# Patient Record
Sex: FEMALE | Race: WHITE | URBAN - METROPOLITAN AREA
[De-identification: names, ages, dates, MRNs, and addresses within clinical notes are randomized per-mention and may not be internally consistent; named-entity substitution may affect disease eponyms.]

---

## 2024-06-10 ENCOUNTER — OFFICE VISIT (OUTPATIENT)
Age: 69
End: 2024-06-10
Payer: MEDICARE

## 2024-06-10 VITALS
DIASTOLIC BLOOD PRESSURE: 88 MMHG | OXYGEN SATURATION: 97 % | SYSTOLIC BLOOD PRESSURE: 159 MMHG | TEMPERATURE: 97.6 F | HEART RATE: 60 BPM

## 2024-06-10 DIAGNOSIS — R68.89 BLOOD PRESSURE ALTERATION: ICD-10-CM

## 2024-06-10 DIAGNOSIS — R33.9 RETENTION OF URINE: Primary | ICD-10-CM

## 2024-06-10 DIAGNOSIS — R31.21 ASYMPTOMATIC MICROSCOPIC HEMATURIA: ICD-10-CM

## 2024-06-10 DIAGNOSIS — N81.4 UTERINE PROLAPSE: ICD-10-CM

## 2024-06-10 DIAGNOSIS — R39.15 URINARY URGENCY: ICD-10-CM

## 2024-06-10 DIAGNOSIS — N81.10 PROLAPSE OF FEMALE BLADDER, ACQUIRED: ICD-10-CM

## 2024-06-10 DIAGNOSIS — R10.2 PELVIC PRESSURE IN FEMALE: ICD-10-CM

## 2024-06-10 PROBLEM — M67.441 GANGLION CYST OF FINGER OF RIGHT HAND: Status: ACTIVE | Noted: 2024-06-10

## 2024-06-10 PROBLEM — E78.00 HYPERCHOLESTEROLEMIA: Status: ACTIVE | Noted: 2020-09-01

## 2024-06-10 PROBLEM — R73.09 ABNORMAL GLUCOSE: Status: ACTIVE | Noted: 2020-09-01

## 2024-06-10 LAB
BILIRUBIN, POC: ABNORMAL
BLOOD URINE, POC: ABNORMAL
CLARITY, POC: CLEAR
COLOR, POC: YELLOW
GLUCOSE URINE, POC: ABNORMAL
KETONES, POC: ABNORMAL
LEUKOCYTE EST, POC: ABNORMAL
NITRITE, POC: ABNORMAL
PH, POC: 6
PROTEIN, POC: ABNORMAL
SPECIFIC GRAVITY, POC: 1.01
UROBILINOGEN, POC: ABNORMAL

## 2024-06-10 PROCEDURE — 1123F ACP DISCUSS/DSCN MKR DOCD: CPT

## 2024-06-10 PROCEDURE — 81003 URINALYSIS AUTO W/O SCOPE: CPT

## 2024-06-10 PROCEDURE — 51798 US URINE CAPACITY MEASURE: CPT

## 2024-06-10 PROCEDURE — 99213 OFFICE O/P EST LOW 20 MIN: CPT

## 2024-06-10 RX ORDER — CYCLOBENZAPRINE HCL 10 MG
10 TABLET ORAL 2 TIMES DAILY PRN
COMMUNITY
Start: 1900-01-01

## 2024-06-10 RX ORDER — IBUPROFEN 200 MG
200 TABLET ORAL EVERY 6 HOURS PRN
COMMUNITY

## 2024-06-10 ASSESSMENT — ENCOUNTER SYMPTOMS
COUGH: 0
ABDOMINAL PAIN: 0
GASTROINTESTINAL NEGATIVE: 1
CHEST TIGHTNESS: 0
CONSTIPATION: 0
FACIAL SWELLING: 0
RECTAL PAIN: 0
DIARRHEA: 0
VOICE CHANGE: 0
NAUSEA: 0
VOMITING: 0
TROUBLE SWALLOWING: 0
BLOOD IN STOOL: 0
SHORTNESS OF BREATH: 0

## 2024-06-10 NOTE — PROGRESS NOTES
[facility], [facility] UROGYNECOLOGY AND PELVIC REHABILITATION   [address] [place] [address]  Dept: [phone]  Date: 6/10/2024  Patient Name: Jeana Worthy    VISIT - FOLLOW UP VISIT     CC: had concerns including Follow-up (Patient here for PVR recheck ).       Chaperone present: None Required    HPI: Patient is here for a follow up on PVR and UA to recheck for hematuria and bladder emptying. She does feel that she can empty her bladder well, denies pelvic pain or vaginal bleeding. Denies kalina hematuria. She does have a known cystocele and uterine prolapse, examined at previous visit. This prolapse is bothersome to her, but she is able to manage. She is not interested in a pessary, but more so interested in a surgical repair. Reports occasional urinary urgency/frequency. Denies any urinary leakage.     Overall state of well-being, dietary and nutritional habits, exercise routines of at least 30 minutes 3 times a week, bowel and bladder function, smoking history, HRT history, sexual activity and partner/s, dyspareunia, and immunizations all revisited if necessary by chart review or direct questioning.    Topics of Prolapse, Pain, Pressure were revisited including type, period of onset, level of severity, quality and quantity, associations, trends, exacerbators, alleviators, bleeding, prolapse to reduce, splinting, and prior treatment / surgery.    Topics of Urinary Leakage were revisited including type, period of onset, level of severity, quality and quantity, associations, trends, exacerbators, alleviators, urgency, frequency, nocturia ,urge incontinence / stress incontinence triggers, hesitancy, obstruction with need to catheterize, frequent UTI\"s >3 in a year diagnosed by culture, hematuria (gross or microscopic), urinary stones, and prior treatment / surgery.    Topics of Fecal Incontinence were revisited including type, period of

## 2024-06-10 NOTE — PATIENT INSTRUCTIONS
rectocele repair, you may need to have an enema the night before the surgery.     Anesthesia   You will often have your choice of either general or regional anesthesia . With general anesthesia, you will be asleep. Regional anesthesia will numb your lower body, but you will be awake.     Description of the Procedure   A bladder catheter will be inserted in the urethra to decrease pressure on the bladder.   A cut in the skin will be made to expose the involved muscle and tissue. In some cases, the muscles and tissue will be sewn back onto itself. This will make it stronger. In other cases, a mesh-type material will be used to strengthen the tissue. Any tissue that has been weakened by previous surgeries, pregnancies, or age will be removed. Excess vaginal lining will be removed as well.   In some cases, a suspension or elevation procedure may be done to provide extra support to the bladder.     How Long Will It Take?   45 minutes to two or more hours     How Much Will It Hurt?   You will likely experience vaginal discomfort for 1-2 weeks following the surgery. You will be given medicine to help relieve this.     Average Hospital Stay   The usual length of stay is 1-2 days. Your doctor may choose to keep you longer if complications arise.     Post-procedure Care At the Hospital   A medicated vaginal packing is usually left in the vagina overnight.   If you had a rectocele repair, the bladder catheter will be removed as soon as you are able to use the restroom on your own. If you had a cystocele repair, the bladder catheter often needs to stay in longer (sometimes 2-6 days). This will allow the bladder time to begin working normally again.   You may notice a smelly, even bloody, discharge from the vagina for 1-2 weeks.     At Home   When you return home, do the following to help ensure a smooth recovery:   Avoid lifting anything that weighs more than 10 pounds for about six weeks.   Avoid sexual intercourse for about

## 2024-10-02 ENCOUNTER — PROCEDURE VISIT (OUTPATIENT)
Age: 69
End: 2024-10-02

## 2024-10-02 DIAGNOSIS — N81.10 PROLAPSE OF FEMALE BLADDER, ACQUIRED: ICD-10-CM

## 2024-10-02 DIAGNOSIS — R33.9 RETENTION OF URINE: Primary | ICD-10-CM

## 2024-10-02 DIAGNOSIS — R39.15 URINARY URGENCY: ICD-10-CM

## 2024-10-02 DIAGNOSIS — R10.2 PELVIC PRESSURE IN FEMALE: ICD-10-CM

## 2024-10-02 DIAGNOSIS — N81.4 UTERINE PROLAPSE: ICD-10-CM

## 2024-10-02 DIAGNOSIS — R31.21 ASYMPTOMATIC MICROSCOPIC HEMATURIA: ICD-10-CM

## 2024-10-07 NOTE — PROGRESS NOTES
vaginal hysterectomy and repairs.    2.   Educational handouts were discussed & given when applicable for surgery.       Multiple records reviewed. All questions were addressed to the patient's satisfaction.  Additional time spent regarding today's visit:  45 Performing: ACVISITTIMEPERFORM: Pre-visit chart review and note construction, Extensive counseling, Covering additional health topics on top of those listed in the chief complaint, and Additional testing and or procedures    Point of care: The supervising physician was present & available for assistance during the critical portions of the visit & procedure    Follow up: Return if symptoms worsen or fail to improve, for post op.     Electronically signed by Shiraz Gray DO on 10/8/2024 at 10:40 AM

## 2024-10-08 ENCOUNTER — PROCEDURE VISIT (OUTPATIENT)
Age: 69
End: 2024-10-08
Payer: MEDICARE

## 2024-10-08 VITALS — DIASTOLIC BLOOD PRESSURE: 80 MMHG | SYSTOLIC BLOOD PRESSURE: 116 MMHG

## 2024-10-08 DIAGNOSIS — N81.10 PROLAPSE OF FEMALE BLADDER, ACQUIRED: ICD-10-CM

## 2024-10-08 DIAGNOSIS — Z41.9 ELECTIVE SURGERY: ICD-10-CM

## 2024-10-08 DIAGNOSIS — N81.4 UTERINE PROLAPSE: ICD-10-CM

## 2024-10-08 DIAGNOSIS — R39.15 URINARY URGENCY: ICD-10-CM

## 2024-10-08 DIAGNOSIS — R33.9 RETENTION OF URINE: Primary | ICD-10-CM

## 2024-10-08 DIAGNOSIS — R31.21 ASYMPTOMATIC MICROSCOPIC HEMATURIA: ICD-10-CM

## 2024-10-08 DIAGNOSIS — R10.2 PELVIC PRESSURE IN FEMALE: ICD-10-CM

## 2024-10-08 DIAGNOSIS — I86.2 VARICOSITIES OF PELVIS: ICD-10-CM

## 2024-10-08 PROCEDURE — 99459 PELVIC EXAMINATION: CPT | Performed by: OBSTETRICS & GYNECOLOGY

## 2024-10-08 PROCEDURE — 1123F ACP DISCUSS/DSCN MKR DOCD: CPT | Performed by: OBSTETRICS & GYNECOLOGY

## 2024-10-08 PROCEDURE — 52000 CYSTOURETHROSCOPY: CPT | Performed by: OBSTETRICS & GYNECOLOGY

## 2024-10-08 PROCEDURE — 99215 OFFICE O/P EST HI 40 MIN: CPT | Performed by: OBSTETRICS & GYNECOLOGY

## 2024-10-08 NOTE — PATIENT INSTRUCTIONS
SURGICAL COUNSELING   PREOPERATIVE CONSIDERATIONS:  DECISION FOR SURGERY The decision to have surgery is completely up to the patient. They have either failed conservative measures, decline conservative measures in favor of elective surgery, or have an emergent need for surgical intervention.   HOSPITAL STAY: For outpatient procedures, the patient will be dismissed from the hospital or surgery setting when stable and meeting criteria for discharge. Less than 5% of patients may rebound to an emergency setting for some of the risks mentioned above, even though they have met criteria for dismissal earlier. Outpatient surgical recovery usually occurs between 2 and 4 weeks. For inpatient procedures, the hospital stay may be 1-3 days, and the patient may not be fully recovered from major surgery for up to 6-8 weeks.  PREOPERATIVE EDUCATION: Written handouts and additional video links regarding perioperative education for the proposed surgery were given to the patient.   RESIDENT ASSISTANCE: The patient gives permission for Dr. Gray's on service resident to assist in their care which may include learning based performance of part of the surgical procedure. The patient understands Dr. Gray will be present at all times during the procedure and will always complete its critical portion.   PROCEED AS INDICATED: The patient gives Dr. Shiraz Gray consent to PROCEED AS INDICATED if additional surgery is needed for pathology discovered at the time of surgery. (Example: potential vaginal hysterectomy during a cystocele repair if uterus drops under anesthesia but is not evident on a prior office visit).   Sabianist: If the patient does not accept blood products, they understand that gynecologic surgery may have the risk of blood loss resulting in major complications including but not limited to hemorrhage, anemia, further surgery, infection, incisional breakdown, cardiac arrest, stroke, or death. Although Dr. Gray may

## 2024-10-29 ENCOUNTER — PREP FOR PROCEDURE (OUTPATIENT)
Age: 69
End: 2024-10-29

## 2024-10-29 DIAGNOSIS — N81.10 FEMALE CYSTOCELE: ICD-10-CM

## 2024-10-29 DIAGNOSIS — N81.4 UTERINE PROLAPSE: ICD-10-CM

## 2024-10-29 DIAGNOSIS — Z01.818 PRE-OP TESTING: Primary | ICD-10-CM

## 2024-11-14 ENCOUNTER — HOSPITAL ENCOUNTER (OUTPATIENT)
Dept: PREADMISSION TESTING | Age: 69
Discharge: HOME | End: 2024-11-18
Payer: MEDICARE

## 2024-11-14 VITALS
SYSTOLIC BLOOD PRESSURE: 160 MMHG | OXYGEN SATURATION: 96 % | DIASTOLIC BLOOD PRESSURE: 91 MMHG | HEART RATE: 87 BPM | TEMPERATURE: 98 F | HEIGHT: 61 IN | BODY MASS INDEX: 34.17 KG/M2 | WEIGHT: 181 LBS | RESPIRATION RATE: 24 BRPM

## 2024-11-14 DIAGNOSIS — Z01.818 PRE-OP TESTING: Primary | ICD-10-CM

## 2024-11-14 LAB
ABO + RH BLD: NORMAL
ALBUMIN SERPL-MCNC: 4.5 G/DL (ref 3.5–5.2)
ANION GAP SERPL CALCULATED.3IONS-SCNC: 12 MMOL/L (ref 9–16)
ARM BAND NUMBER: NORMAL
BLOOD BANK SAMPLE EXPIRATION: NORMAL
BLOOD GROUP ANTIBODIES SERPL: NEGATIVE
BUN SERPL-MCNC: 25 MG/DL (ref 8–23)
CALCIUM SERPL-MCNC: 9.3 MG/DL (ref 8.6–10.4)
CHLORIDE SERPL-SCNC: 103 MMOL/L (ref 98–107)
CO2 SERPL-SCNC: 24 MMOL/L (ref 20–31)
CREAT SERPL-MCNC: 0.7 MG/DL (ref 0.6–0.9)
ERYTHROCYTE [DISTWIDTH] IN BLOOD BY AUTOMATED COUNT: 12.8 % (ref 11.8–14.4)
GFR, ESTIMATED: >90 ML/MIN/1.73M2
GLUCOSE SERPL-MCNC: 94 MG/DL (ref 74–99)
HCT VFR BLD AUTO: 41.6 % (ref 36.3–47.1)
HGB BLD-MCNC: 14 G/DL (ref 11.9–15.1)
MCH RBC QN AUTO: 31.5 PG (ref 25.2–33.5)
MCHC RBC AUTO-ENTMCNC: 33.7 G/DL (ref 28.4–34.8)
MCV RBC AUTO: 93.5 FL (ref 82.6–102.9)
NRBC BLD-RTO: 0 PER 100 WBC
PLATELET # BLD AUTO: 220 K/UL (ref 138–453)
PMV BLD AUTO: 10.2 FL (ref 8.1–13.5)
POTASSIUM SERPL-SCNC: 4.5 MMOL/L (ref 3.7–5.3)
RBC # BLD AUTO: 4.45 M/UL (ref 3.95–5.11)
SODIUM SERPL-SCNC: 139 MMOL/L (ref 136–145)
WBC OTHER # BLD: 4.4 K/UL (ref 3.5–11.3)

## 2024-11-14 PROCEDURE — 93005 ELECTROCARDIOGRAM TRACING: CPT

## 2024-11-14 PROCEDURE — 85027 COMPLETE CBC AUTOMATED: CPT

## 2024-11-14 PROCEDURE — 80048 BASIC METABOLIC PNL TOTAL CA: CPT

## 2024-11-14 PROCEDURE — 86900 BLOOD TYPING SEROLOGIC ABO: CPT

## 2024-11-14 PROCEDURE — 86850 RBC ANTIBODY SCREEN: CPT

## 2024-11-14 PROCEDURE — 86901 BLOOD TYPING SEROLOGIC RH(D): CPT

## 2024-11-14 PROCEDURE — 36415 COLL VENOUS BLD VENIPUNCTURE: CPT

## 2024-11-14 PROCEDURE — 82040 ASSAY OF SERUM ALBUMIN: CPT

## 2024-11-14 NOTE — DISCHARGE INSTRUCTIONS
DAY OF SURGERY/PROCEDURE  GUIDELINES    As a patient at the [facility], you can expect quality medical and nursing care that is centered on your individual needs. It is our goal to make your surgical experience as comfortable and excellent as possible.  ________________________________________________________________________    The following instructions are general guidelines, if any information on this sheet is different from what your doctor has instructed you to do, please follow your doctor's instructions.    Please arrive on 11/29/2024 @ 0600      Enter through entrance C. Check in at registration     Upon arrival you will be taken to the pre-operative area to get ready for surgery, your family will stay in the waiting room and visit with you once you are ready for surgery. Due to special limitations please limit visitation to 1-2 members of your family at a time. When it is time for surgery your family will return to the waiting room.    Nothing to eat, drink, smoke, suck or chew after midnight (no water, gum, mints, cigarettes, cigars, pipes, snuff, chewing tobacco, etc.) or your surgery may be canceled.     Take a shower or bath on the morning of your surgery/procedure (Hibiclens if directed) Do not apply any lotions.    Brush your teeth, but do not swallow any water    IN CASE OF ILLNESS - If you have a cold or flu symptoms (high fever, runny nose, sore throat, cough, etc.) rash, nausea, vomiting, loose stools, and/or recent contact with someone who has a contagious disease (chick pox, measles, etc.) please call your doctor before coming to the surgery center    Take a small sip of water with heart, blood pressure, and/or seizure medication the morning of surgery.     If applicable bring your:  Inhaler (s)  Hearing aid(s)  Eyeglasses and Case (If you wear contacts they have to be removed before surgery, bring case and solution)  CPAP     DO NOT take anticoagulants (blood thinners,  aspirin or aspirin-containing products) as instructed by your physician.    DO NOT take any diabetic pills or insulin morning of your surgery.     Leave all jewelry at home and wear loose, comfortable clothing that is easy to put on and take off.     If you will be returning home the same day as your surgery, you will need to have a responsible adult (18 years of age or older) present to drive you home. You will need someone stay with you at home for the first 24 hours following your surgery. This is due to the anesthesia and the medication given to you during surgery and recovery.

## 2024-11-15 LAB
EKG ATRIAL RATE: 61 BPM
EKG P AXIS: 42 DEGREES
EKG P-R INTERVAL: 244 MS
EKG Q-T INTERVAL: 436 MS
EKG QRS DURATION: 86 MS
EKG QTC CALCULATION (BAZETT): 438 MS
EKG R AXIS: 4 DEGREES
EKG T AXIS: 40 DEGREES
EKG VENTRICULAR RATE: 61 BPM

## 2024-11-15 PROCEDURE — 93010 ELECTROCARDIOGRAM REPORT: CPT | Performed by: INTERNAL MEDICINE

## 2024-11-25 ENCOUNTER — ANESTHESIA EVENT (OUTPATIENT)
Dept: OPERATING ROOM | Age: 69
End: 2024-11-25
Payer: MEDICARE

## 2024-11-28 NOTE — BRIEF OP NOTE
Brief Operative Note  Department of Uroynecology  [facility]     Patient: Jeana Worthy   : 1955  MRN: [id number]       Acct: [id number]   Date of Procedure: 2024    Pre-operative Diagnosis: 69 y.o. female    Urinary retention  Prolapse of the female bladder  Uterine prolapse  Urinary urgency  Varicosities of pelvis  BMI 34    Post-operative Diagnosis:   Urinary retention  Prolapse of the female bladder  Uterine prolapse  Urinary urgency  Varicosities of pelvis  BMI 34    Procedure: Robotic assisted laparoscopic hysterectomy, bilateral salpingo-oophorectomy, anterior colporrhaphy, cystoscopy    Surgeon: Dr. Shiraz Gray DO     Assistant(s): ambrose Zapata, PGY4    Anesthesia: general via ETT    Findings:  normal appearing external genitalia, grade 3 cystocele, normal appearing uterus, bilateral fallopian tubes and ovaries, bilateral ureteral jets visualized, normal appearing bladder mucosa   Total IV fluids/Blood products:  1000 ml crystalloid  Urine Output:  200 ml    Estimated blood loss:  10ml  Drains:  none  Specimens:  uterus, cervix, bilateral fallopian tubes and ovaries, vaginal mucosa   Instrument and Sponge Count: Correct  Complications:  none  Condition:  stable, transferred to post anesthesia recovery    Neil Schwartz MD  Urogyn Resident  2024, 4:10 PM

## 2024-11-28 NOTE — DISCHARGE INSTRUCTIONS
POSTOPERATIVE PATIENT INSTRUCTIONS  MAJOR & MINOR SURGICAL PROCEDURES      Congratulations! You have taken the brave step of undergoing surgery in order to try to improve your health.  Now it is time to focus on healing outside of the hospital / surgery center setting.  To make your dismissal as successful as possible, it is recommended that you thoroughly review these postoperative instructions. These instructions pertain to, but are not limited to the following procedures:      MAJOR   Hysterectomy - Vaginal / Laparoscopic / Robotic   With or Without tube-ovarian Removal (Salpingo-oophorectomy)   Sacrocolpopexy / Sacroperineopexy / Sacrocervicopexy/ Hysteropexy (lifting apex to sacrum)  Major Vaginal Prolapse repairs   Cystocele repair (Anterior Colporrhaphy)   Rectocele repair (Posterior Colporrhaphy)   Enterocele repair    Vaginal vault repair   Closing or removal of the vagina (Colpocleisis / Colpectomy)   Major urinary incontinence surgery (Saunders Urethropexy, MMK)   Major fibroid removal (Myomectomy)   Major tubal surgery (re-anastomosis, ectopic pregnancy, pelvic inflammatory disease)   Major surgery for adhesions or endometriosis involving bowel   Major vaginal mesh removal    Fistula repair of the bladder or colorectum to the vagina   Creation of a new vagina (Neovaginoplasty) or repair of a Mullerian Anomaly   Other       MINOR   Hysteroscopy with Dilatation / Curettage, Endometrial Ablation   Laparoscopy With or Without minor tubal or ovarian surgery   Minor Vaginal Prolapse repairs   Cystocele repair (Anterior Colporrhaphy)   Rectocele repair (Posterior Colporrhaphy)   Urethrocele repair    Minor urinary incontinence surgery (Slings, Transurethral Bulking)   Minor vaginal surgery (Mesh removal, Laser ablation, Biopsies, Labial revisions, Injections)    Minor surgery of the bladder (DMSO, Hydrodistention, Botox, etc.)   Neuromodulation  Other     1         2      POST OPERATIVE BASIC INSTRUCTIONS                                                                                                                                                                                 13  [facility]   UROGYNECOLOGY & WOMEN'S HEALTH         HOME SUPPLY LIST      Please contact your local pharmacy or medical supply store regarding supplies for the type of catheter you may have.      Transurethral Gates catheter   1 Night bag and 1 Day bag with leg strap   Lubricating jelly   Alcohol wipes   Measuring hat for toilet seat   5 50 mL syringes      Self-Intermittent Catheterization Supplies:   100 #11-Kazakh female catheters (hydrophilic if possible)   Lubricating jelly   Alcohol wipes   Measuring hat for toilet seat   Hand-held mirror      Suprapubic catheter kit   1 Roll Transpire tape 1 inch ×10 yards.   1.  Box of 25 drain dressings, precut, 6 pi 4 x 4   1 package catheter plugs   1.  2000 mL urinary drainage bag   1 day bag with leg strap   1 60 mL syringe catheter tip   1 Quart vinegar   1 box appropriately sized clinical exam gloves.     You may obtain the above supplies at your local pharmacy or medical supply shop.     Call the pharmacy or medical supply shop first to check availability and pricing.              -- Scope patch used for post-op nausea and vomiting is good for 72 hours from the point of placement.  Remove any time within the 72 hour time frame when you're nausea is under control.  When removing, use a glove or a napkin and then dispose.    What are the possible side effects of scopolamine transdermal?  Get emergency medical help if you have signs of an allergic reaction: hives; difficulty breathing; swelling of your face, lips, tongue, or throat.  Remove the skin patch and call your doctor at once if you have:  eye pain or redness, blurred vision, dilated pupils;  decreased urination, painful or difficult urination;  stomach pain, nausea, vomiting;  a seizure; or  confusion, agitation, extreme

## 2024-11-28 NOTE — DISCHARGE SUMMARY
every 6 hours as needed (cramping, gas pain)            CHANGE how you take these medications      * Advil 200 MG tablet  Generic drug: ibuprofen  What changed: Another medication with the same name was added. Make sure you understand how and when to take each.     * ibuprofen 600 MG tablet  Commonly known as: ADVIL;MOTRIN  Take 1 tablet by mouth in the morning, at noon, and at bedtime for 30 doses  What changed: You were already taking a medication with the same name, and this prescription was added. Make sure you understand how and when to take each.           * This list has 2 medication(s) that are the same as other medications prescribed for you. Read the directions carefully, and ask your doctor or other care provider to review them with you.                   Where to Get Your Medications        These medications were sent to [id number] - [place], [place] - [address] [facility] - P [phone] - F [phone] [address] [facility] [address]      Phone: [phone]   ibuprofen 600 MG tablet  ondansetron 4 MG tablet  oxyCODONE-acetaminophen 5-325 MG per tablet  senna-docusate 8.6-50 MG per tablet  Simethicone 80 MG Tabs           Activity: pelvic rest x 8 weeks, no driving on narcotics, no lifting greater than 15 lbs  Diet: regular diet  Follow up: 1 week     Condition on discharge: good and stable   Discharge Date: 11/29/24    Comments:  Home care, Follow-up care, restrictions reviewed.    Lilibeth Zapata DO  Urogynecology Resident  [facility]  11/29/2024, 11:58 AM

## 2024-11-28 NOTE — H&P
UroGyn Pre-Op H&P  [facility]    Patient Name: Jeana Worthy     Patient : 1955  [facility]  Admission Date/Time: 2024  6:05 AM  Primary Care Physician: Valdo Goodwin MD  MRN: [id number]    Date: 2024  Time: 7:08 AM    The patient was seen in pre-op holding. She is here for Robotic assisted laparoscopic hysterectomy, bilateral salpingo-oophorectomy, anterior colporrhaphy, cystoscopy.    The patient is being admitted for a planned elective surgical procedure today. She has had symptoms, physical findings, and diagnostic testing that have an appropriate indication for today's planned procedure. The patient has been educated about their condition, conservative and surgical options was been offered to the patient, and the patient has decided to proceed with a surgical option. An informed consent has been signed under no duress or confusion. If indicated, the patient has been surgically cleared by her PCP and /or any pertinent specialist. All labs and testing have been reviewed. If of reproductive age and without permanent sterilization, a pregnancy test was confirmed as negative. The patient has satisfactorily completed any pre-operative preparations prior to surgery. If MRSA positive, she had completed the standard surgical preparation protocol. The patient took any required medicines prior to surgery with a sip of water but otherwise had taken nothing by mouth. The patient has discontinued any blood thinners as required to proceed with surgery. The patient denies chest pain, shortness of breath, calf pain, or open sores on the day of surgery. All dentures and body jewelry have been removed and / or taped.      REVIEW OF SYSTEMS:  14 point ROS negative except for above listed in HPI.   General/Constitutional: Denies chills, fever, headaches, weight gain, weight loss   Ophthalmologic: Denies recent abrupt vision changes, blurry vision   ENT: Denies nasal

## 2024-11-29 ENCOUNTER — HOSPITAL ENCOUNTER (OUTPATIENT)
Age: 69
Setting detail: OUTPATIENT SURGERY
Discharge: HOME | End: 2024-11-29
Attending: OBSTETRICS & GYNECOLOGY | Admitting: OBSTETRICS & GYNECOLOGY
Payer: MEDICARE

## 2024-11-29 ENCOUNTER — ANESTHESIA (OUTPATIENT)
Dept: OPERATING ROOM | Age: 69
End: 2024-11-29
Payer: MEDICARE

## 2024-11-29 VITALS
TEMPERATURE: 97.3 F | DIASTOLIC BLOOD PRESSURE: 81 MMHG | RESPIRATION RATE: 20 BRPM | OXYGEN SATURATION: 91 % | HEIGHT: 61 IN | SYSTOLIC BLOOD PRESSURE: 163 MMHG | WEIGHT: 180 LBS | HEART RATE: 55 BPM | BODY MASS INDEX: 33.99 KG/M2

## 2024-11-29 DIAGNOSIS — N81.10 FEMALE CYSTOCELE: ICD-10-CM

## 2024-11-29 DIAGNOSIS — N81.4 UTERINE PROLAPSE: ICD-10-CM

## 2024-11-29 DIAGNOSIS — G89.18 POST-OP PAIN: Primary | ICD-10-CM

## 2024-11-29 PROBLEM — Z90.710 HISTORY OF ROBOT-ASSISTED LAPAROSCOPIC HYSTERECTOMY: Status: ACTIVE | Noted: 2024-11-29

## 2024-11-29 PROCEDURE — 6360000002 HC RX W HCPCS: Performed by: ANESTHESIOLOGY

## 2024-11-29 PROCEDURE — S2900 ROBOTIC SURGICAL SYSTEM: HCPCS | Performed by: OBSTETRICS & GYNECOLOGY

## 2024-11-29 PROCEDURE — 2709999900 HC NON-CHARGEABLE SUPPLY: Performed by: OBSTETRICS & GYNECOLOGY

## 2024-11-29 PROCEDURE — 3700000000 HC ANESTHESIA ATTENDED CARE: Performed by: OBSTETRICS & GYNECOLOGY

## 2024-11-29 PROCEDURE — 7100000011 HC PHASE II RECOVERY - ADDTL 15 MIN: Performed by: OBSTETRICS & GYNECOLOGY

## 2024-11-29 PROCEDURE — 58999 UNLISTED PX FML GENITAL SYS: CPT | Performed by: OBSTETRICS & GYNECOLOGY

## 2024-11-29 PROCEDURE — 2500000003 HC RX 250 WO HCPCS: Performed by: NURSE ANESTHETIST, CERTIFIED REGISTERED

## 2024-11-29 PROCEDURE — 7100000000 HC PACU RECOVERY - FIRST 15 MIN: Performed by: OBSTETRICS & GYNECOLOGY

## 2024-11-29 PROCEDURE — 57283 COLPOPEXY INTRAPERITONEAL: CPT | Performed by: OBSTETRICS & GYNECOLOGY

## 2024-11-29 PROCEDURE — 6370000000 HC RX 637 (ALT 250 FOR IP)

## 2024-11-29 PROCEDURE — 3600000019 HC SURGERY ROBOT ADDTL 15MIN: Performed by: OBSTETRICS & GYNECOLOGY

## 2024-11-29 PROCEDURE — 3600000009 HC SURGERY ROBOT BASE: Performed by: OBSTETRICS & GYNECOLOGY

## 2024-11-29 PROCEDURE — 64488 TAP BLOCK BI INJECTION: CPT | Performed by: ANESTHESIOLOGY

## 2024-11-29 PROCEDURE — 57240 ANTERIOR COLPORRHAPHY: CPT | Performed by: OBSTETRICS & GYNECOLOGY

## 2024-11-29 PROCEDURE — 6360000002 HC RX W HCPCS: Performed by: NURSE ANESTHETIST, CERTIFIED REGISTERED

## 2024-11-29 PROCEDURE — 51725 SIMPLE CYSTOMETROGRAM: CPT | Performed by: OBSTETRICS & GYNECOLOGY

## 2024-11-29 PROCEDURE — 7100000001 HC PACU RECOVERY - ADDTL 15 MIN: Performed by: OBSTETRICS & GYNECOLOGY

## 2024-11-29 PROCEDURE — 6360000002 HC RX W HCPCS

## 2024-11-29 PROCEDURE — 57270 REPAIR OF BOWEL POUCH: CPT | Performed by: OBSTETRICS & GYNECOLOGY

## 2024-11-29 PROCEDURE — 6360000002 HC RX W HCPCS: Performed by: STUDENT IN AN ORGANIZED HEALTH CARE EDUCATION/TRAINING PROGRAM

## 2024-11-29 PROCEDURE — 3700000001 HC ADD 15 MINUTES (ANESTHESIA): Performed by: OBSTETRICS & GYNECOLOGY

## 2024-11-29 PROCEDURE — 58400 SUSPENSION OF UTERUS: CPT | Performed by: OBSTETRICS & GYNECOLOGY

## 2024-11-29 PROCEDURE — 2580000003 HC RX 258: Performed by: ANESTHESIOLOGY

## 2024-11-29 PROCEDURE — 2500000003 HC RX 250 WO HCPCS

## 2024-11-29 PROCEDURE — 88302 TISSUE EXAM BY PATHOLOGIST: CPT

## 2024-11-29 PROCEDURE — 58552 LAPARO-VAG HYST INCL T/O: CPT | Performed by: OBSTETRICS & GYNECOLOGY

## 2024-11-29 PROCEDURE — 88307 TISSUE EXAM BY PATHOLOGIST: CPT

## 2024-11-29 PROCEDURE — 7100000010 HC PHASE II RECOVERY - FIRST 15 MIN: Performed by: OBSTETRICS & GYNECOLOGY

## 2024-11-29 RX ORDER — MAGNESIUM SULFATE 1 G/100ML
INJECTION INTRAVENOUS
Status: COMPLETED
Start: 2024-11-29 | End: 2024-11-29

## 2024-11-29 RX ORDER — KETOROLAC TROMETHAMINE 30 MG/ML
INJECTION, SOLUTION INTRAMUSCULAR; INTRAVENOUS
Status: DISCONTINUED | OUTPATIENT
Start: 2024-11-29 | End: 2024-11-29 | Stop reason: SDUPTHER

## 2024-11-29 RX ORDER — ROCURONIUM BROMIDE 10 MG/ML
INJECTION, SOLUTION INTRAVENOUS
Status: DISCONTINUED | OUTPATIENT
Start: 2024-11-29 | End: 2024-11-29 | Stop reason: SDUPTHER

## 2024-11-29 RX ORDER — SODIUM CHLORIDE 0.9 % (FLUSH) 0.9 %
5-40 SYRINGE (ML) INJECTION PRN
Status: DISCONTINUED | OUTPATIENT
Start: 2024-11-29 | End: 2024-11-29 | Stop reason: HOSPADM

## 2024-11-29 RX ORDER — APREPITANT 40 MG/1
CAPSULE ORAL
Status: COMPLETED
Start: 2024-11-29 | End: 2024-11-29

## 2024-11-29 RX ORDER — PROPOFOL 10 MG/ML
INJECTION, EMULSION INTRAVENOUS
Status: DISCONTINUED | OUTPATIENT
Start: 2024-11-29 | End: 2024-11-29 | Stop reason: SDUPTHER

## 2024-11-29 RX ORDER — LABETALOL HYDROCHLORIDE 5 MG/ML
10 INJECTION, SOLUTION INTRAVENOUS
Status: DISCONTINUED | OUTPATIENT
Start: 2024-11-29 | End: 2024-11-29 | Stop reason: HOSPADM

## 2024-11-29 RX ORDER — ONDANSETRON 2 MG/ML
4 INJECTION INTRAMUSCULAR; INTRAVENOUS
Status: DISCONTINUED | OUTPATIENT
Start: 2024-11-29 | End: 2024-11-29 | Stop reason: HOSPADM

## 2024-11-29 RX ORDER — BUPIVACAINE HYDROCHLORIDE 2.5 MG/ML
INJECTION, SOLUTION EPIDURAL; INFILTRATION; INTRACAUDAL
Status: COMPLETED | OUTPATIENT
Start: 2024-11-29 | End: 2024-11-29

## 2024-11-29 RX ORDER — IBUPROFEN 600 MG/1
600 TABLET, FILM COATED ORAL 3 TIMES DAILY
Qty: 30 TABLET | Refills: 0 | Status: SHIPPED | OUTPATIENT
Start: 2024-11-29 | End: 2024-12-09

## 2024-11-29 RX ORDER — MAGNESIUM SULFATE 1 G/100ML
INJECTION INTRAVENOUS
Status: DISCONTINUED | OUTPATIENT
Start: 2024-11-29 | End: 2024-11-29 | Stop reason: SDUPTHER

## 2024-11-29 RX ORDER — PHENYLEPHRINE HCL IN 0.9% NACL 1 MG/10 ML
SYRINGE (ML) INTRAVENOUS
Status: DISCONTINUED | OUTPATIENT
Start: 2024-11-29 | End: 2024-11-29 | Stop reason: SDUPTHER

## 2024-11-29 RX ORDER — MIDAZOLAM HYDROCHLORIDE 2 MG/2ML
2 INJECTION, SOLUTION INTRAMUSCULAR; INTRAVENOUS
Status: COMPLETED | OUTPATIENT
Start: 2024-11-29 | End: 2024-11-29

## 2024-11-29 RX ORDER — MEPERIDINE HYDROCHLORIDE 50 MG/ML
12.5 INJECTION INTRAMUSCULAR; INTRAVENOUS; SUBCUTANEOUS EVERY 5 MIN PRN
Status: DISCONTINUED | OUTPATIENT
Start: 2024-11-29 | End: 2024-11-29 | Stop reason: HOSPADM

## 2024-11-29 RX ORDER — GENTAMICIN SULFATE 80 MG/50ML
80 INJECTION, SOLUTION INTRAVENOUS ONCE
Status: COMPLETED | OUTPATIENT
Start: 2024-11-29 | End: 2024-11-29

## 2024-11-29 RX ORDER — ONDANSETRON 2 MG/ML
INJECTION INTRAMUSCULAR; INTRAVENOUS
Status: DISCONTINUED | OUTPATIENT
Start: 2024-11-29 | End: 2024-11-29 | Stop reason: SDUPTHER

## 2024-11-29 RX ORDER — OXYCODONE AND ACETAMINOPHEN 5; 325 MG/1; MG/1
1 TABLET ORAL EVERY 6 HOURS PRN
Qty: 20 TABLET | Refills: 0 | Status: SHIPPED | OUTPATIENT
Start: 2024-11-29 | End: 2024-12-02

## 2024-11-29 RX ORDER — FAMOTIDINE 10 MG/ML
INJECTION, SOLUTION INTRAVENOUS
Status: COMPLETED
Start: 2024-11-29 | End: 2024-11-29

## 2024-11-29 RX ORDER — SCOLOPAMINE TRANSDERMAL SYSTEM 1 MG/1
1 PATCH, EXTENDED RELEASE TRANSDERMAL
Status: DISCONTINUED | OUTPATIENT
Start: 2024-11-29 | End: 2024-11-29 | Stop reason: HOSPADM

## 2024-11-29 RX ORDER — FENTANYL CITRATE 50 UG/ML
100 INJECTION, SOLUTION INTRAMUSCULAR; INTRAVENOUS
Status: COMPLETED | OUTPATIENT
Start: 2024-11-29 | End: 2024-11-29

## 2024-11-29 RX ORDER — APREPITANT 40 MG/1
40 CAPSULE ORAL ONCE
Status: CANCELLED | OUTPATIENT
Start: 2024-11-29 | End: 2024-11-29

## 2024-11-29 RX ORDER — SCOLOPAMINE TRANSDERMAL SYSTEM 1 MG/1
PATCH, EXTENDED RELEASE TRANSDERMAL
Status: DISCONTINUED
Start: 2024-11-29 | End: 2024-11-29 | Stop reason: HOSPADM

## 2024-11-29 RX ORDER — METOCLOPRAMIDE HYDROCHLORIDE 5 MG/ML
10 INJECTION INTRAMUSCULAR; INTRAVENOUS
Status: DISCONTINUED | OUTPATIENT
Start: 2024-11-29 | End: 2024-11-29 | Stop reason: HOSPADM

## 2024-11-29 RX ORDER — PHENAZOPYRIDINE HYDROCHLORIDE 100 MG/1
100 TABLET, FILM COATED ORAL ONCE
Status: COMPLETED | OUTPATIENT
Start: 2024-11-29 | End: 2024-11-29

## 2024-11-29 RX ORDER — SODIUM CHLORIDE 0.9 % (FLUSH) 0.9 %
5-40 SYRINGE (ML) INJECTION EVERY 12 HOURS SCHEDULED
Status: DISCONTINUED | OUTPATIENT
Start: 2024-11-29 | End: 2024-11-29 | Stop reason: HOSPADM

## 2024-11-29 RX ORDER — GLYCOPYRROLATE 0.2 MG/ML
INJECTION INTRAMUSCULAR; INTRAVENOUS
Status: DISCONTINUED | OUTPATIENT
Start: 2024-11-29 | End: 2024-11-29 | Stop reason: SDUPTHER

## 2024-11-29 RX ORDER — AMOXICILLIN 250 MG
1 CAPSULE ORAL NIGHTLY
Qty: 30 TABLET | Refills: 0 | Status: SHIPPED | OUTPATIENT
Start: 2024-11-29 | End: 2024-12-29

## 2024-11-29 RX ORDER — SODIUM CHLORIDE 9 MG/ML
INJECTION, SOLUTION INTRAVENOUS PRN
Status: DISCONTINUED | OUTPATIENT
Start: 2024-11-29 | End: 2024-11-29 | Stop reason: HOSPADM

## 2024-11-29 RX ORDER — SODIUM CHLORIDE, SODIUM LACTATE, POTASSIUM CHLORIDE, CALCIUM CHLORIDE 600; 310; 30; 20 MG/100ML; MG/100ML; MG/100ML; MG/100ML
INJECTION, SOLUTION INTRAVENOUS CONTINUOUS
Status: DISCONTINUED | OUTPATIENT
Start: 2024-11-29 | End: 2024-11-29 | Stop reason: HOSPADM

## 2024-11-29 RX ORDER — DIPHENHYDRAMINE HYDROCHLORIDE 50 MG/ML
12.5 INJECTION INTRAMUSCULAR; INTRAVENOUS
Status: DISCONTINUED | OUTPATIENT
Start: 2024-11-29 | End: 2024-11-29 | Stop reason: HOSPADM

## 2024-11-29 RX ORDER — CELECOXIB 100 MG/1
200 CAPSULE ORAL ONCE
Status: COMPLETED | OUTPATIENT
Start: 2024-11-29 | End: 2024-11-29

## 2024-11-29 RX ORDER — CELECOXIB 100 MG/1
CAPSULE ORAL
Status: COMPLETED
Start: 2024-11-29 | End: 2024-11-29

## 2024-11-29 RX ORDER — OXYCODONE AND ACETAMINOPHEN 5; 325 MG/1; MG/1
2 TABLET ORAL
Status: DISCONTINUED | OUTPATIENT
Start: 2024-11-29 | End: 2024-11-29 | Stop reason: HOSPADM

## 2024-11-29 RX ORDER — FENTANYL CITRATE 50 UG/ML
INJECTION, SOLUTION INTRAMUSCULAR; INTRAVENOUS
Status: DISCONTINUED | OUTPATIENT
Start: 2024-11-29 | End: 2024-11-29 | Stop reason: SDUPTHER

## 2024-11-29 RX ORDER — MORPHINE SULFATE 2 MG/ML
2 INJECTION, SOLUTION INTRAMUSCULAR; INTRAVENOUS EVERY 5 MIN PRN
Status: DISCONTINUED | OUTPATIENT
Start: 2024-11-29 | End: 2024-11-29 | Stop reason: HOSPADM

## 2024-11-29 RX ORDER — FENTANYL CITRATE 50 UG/ML
100 INJECTION, SOLUTION INTRAMUSCULAR; INTRAVENOUS ONCE
Status: CANCELLED | OUTPATIENT
Start: 2024-11-29 | End: 2024-11-29

## 2024-11-29 RX ORDER — GLYCOPYRROLATE 0.2 MG/ML
0.4 INJECTION INTRAMUSCULAR; INTRAVENOUS ONCE
Status: DISCONTINUED | OUTPATIENT
Start: 2024-11-29 | End: 2024-11-29 | Stop reason: HOSPADM

## 2024-11-29 RX ORDER — ONDANSETRON 4 MG/1
4 TABLET, FILM COATED ORAL 3 TIMES DAILY PRN
Qty: 30 TABLET | Refills: 0 | Status: SHIPPED | OUTPATIENT
Start: 2024-11-29

## 2024-11-29 RX ORDER — DEXAMETHASONE SODIUM PHOSPHATE 10 MG/ML
INJECTION, SOLUTION INTRAMUSCULAR; INTRAVENOUS
Status: DISCONTINUED | OUTPATIENT
Start: 2024-11-29 | End: 2024-11-29 | Stop reason: SDUPTHER

## 2024-11-29 RX ORDER — IPRATROPIUM BROMIDE AND ALBUTEROL SULFATE 2.5; .5 MG/3ML; MG/3ML
1 SOLUTION RESPIRATORY (INHALATION)
Status: DISCONTINUED | OUTPATIENT
Start: 2024-11-29 | End: 2024-11-29 | Stop reason: HOSPADM

## 2024-11-29 RX ORDER — MIDAZOLAM HYDROCHLORIDE 1 MG/ML
INJECTION, SOLUTION INTRAMUSCULAR; INTRAVENOUS
Status: COMPLETED
Start: 2024-11-29 | End: 2024-11-29

## 2024-11-29 RX ORDER — MIDAZOLAM HYDROCHLORIDE 2 MG/2ML
2 INJECTION, SOLUTION INTRAMUSCULAR; INTRAVENOUS ONCE
Status: CANCELLED | OUTPATIENT
Start: 2024-11-29 | End: 2024-11-29

## 2024-11-29 RX ORDER — PHENAZOPYRIDINE HYDROCHLORIDE 100 MG/1
TABLET, FILM COATED ORAL
Status: COMPLETED
Start: 2024-11-29 | End: 2024-11-29

## 2024-11-29 RX ORDER — GABAPENTIN 300 MG/1
300 CAPSULE ORAL ONCE
Status: COMPLETED | OUTPATIENT
Start: 2024-11-29 | End: 2024-11-29

## 2024-11-29 RX ORDER — MIDAZOLAM HYDROCHLORIDE 2 MG/2ML
2 INJECTION, SOLUTION INTRAMUSCULAR; INTRAVENOUS
Status: DISCONTINUED | OUTPATIENT
Start: 2024-11-29 | End: 2024-11-29 | Stop reason: HOSPADM

## 2024-11-29 RX ORDER — OXYCODONE AND ACETAMINOPHEN 5; 325 MG/1; MG/1
1 TABLET ORAL
Status: DISCONTINUED | OUTPATIENT
Start: 2024-11-29 | End: 2024-11-29 | Stop reason: HOSPADM

## 2024-11-29 RX ORDER — GABAPENTIN 300 MG/1
CAPSULE ORAL
Status: COMPLETED
Start: 2024-11-29 | End: 2024-11-29

## 2024-11-29 RX ORDER — DEXMEDETOMIDINE HYDROCHLORIDE 4 UG/ML
INJECTION, SOLUTION INTRAVENOUS
Status: DISCONTINUED
Start: 2024-11-29 | End: 2024-11-29 | Stop reason: HOSPADM

## 2024-11-29 RX ORDER — HYDRALAZINE HYDROCHLORIDE 20 MG/ML
10 INJECTION INTRAMUSCULAR; INTRAVENOUS
Status: DISCONTINUED | OUTPATIENT
Start: 2024-11-29 | End: 2024-11-29 | Stop reason: HOSPADM

## 2024-11-29 RX ORDER — LIDOCAINE HYDROCHLORIDE 10 MG/ML
INJECTION, SOLUTION EPIDURAL; INFILTRATION; INTRACAUDAL; PERINEURAL
Status: DISCONTINUED | OUTPATIENT
Start: 2024-11-29 | End: 2024-11-29 | Stop reason: SDUPTHER

## 2024-11-29 RX ORDER — DEXMEDETOMIDINE HYDROCHLORIDE 100 UG/ML
INJECTION, SOLUTION INTRAVENOUS
Status: DISCONTINUED | OUTPATIENT
Start: 2024-11-29 | End: 2024-11-29 | Stop reason: SDUPTHER

## 2024-11-29 RX ORDER — CLINDAMYCIN PHOSPHATE 900 MG/50ML
900 INJECTION, SOLUTION INTRAVENOUS EVERY 8 HOURS
Status: DISCONTINUED | OUTPATIENT
Start: 2024-11-29 | End: 2024-11-29 | Stop reason: HOSPADM

## 2024-11-29 RX ORDER — NALOXONE HYDROCHLORIDE 0.4 MG/ML
INJECTION, SOLUTION INTRAMUSCULAR; INTRAVENOUS; SUBCUTANEOUS PRN
Status: DISCONTINUED | OUTPATIENT
Start: 2024-11-29 | End: 2024-11-29 | Stop reason: HOSPADM

## 2024-11-29 RX ADMIN — APREPITANT 40 MG: 40 CAPSULE ORAL at 07:13

## 2024-11-29 RX ADMIN — MIDAZOLAM HYDROCHLORIDE 2 MG: 1 INJECTION, SOLUTION INTRAMUSCULAR; INTRAVENOUS at 07:19

## 2024-11-29 RX ADMIN — PROPOFOL 150 MG: 10 INJECTION, EMULSION INTRAVENOUS at 07:34

## 2024-11-29 RX ADMIN — CELECOXIB 200 MG: 100 CAPSULE ORAL at 06:45

## 2024-11-29 RX ADMIN — GABAPENTIN 300 MG: 300 CAPSULE ORAL at 06:43

## 2024-11-29 RX ADMIN — PROPOFOL 50 MG: 10 INJECTION, EMULSION INTRAVENOUS at 09:21

## 2024-11-29 RX ADMIN — PHENAZOPYRIDINE HYDROCHLORIDE 100 MG: 100 TABLET, FILM COATED ORAL at 06:46

## 2024-11-29 RX ADMIN — ONDANSETRON 4 MG: 2 INJECTION INTRAMUSCULAR; INTRAVENOUS at 08:53

## 2024-11-29 RX ADMIN — ROCURONIUM BROMIDE 50 MG: 10 INJECTION, SOLUTION INTRAVENOUS at 07:34

## 2024-11-29 RX ADMIN — SUGAMMADEX 200 MG: 100 INJECTION, SOLUTION INTRAVENOUS at 09:21

## 2024-11-29 RX ADMIN — CLINDAMYCIN IN 5 PERCENT DEXTROSE 900 MG: 18 INJECTION, SOLUTION INTRAVENOUS at 07:47

## 2024-11-29 RX ADMIN — DEXMEDETOMIDINE HCL 8 MCG: 100 INJECTION INTRAVENOUS at 07:34

## 2024-11-29 RX ADMIN — BUPIVACAINE HYDROCHLORIDE 80 ML: 2.5 INJECTION, SOLUTION EPIDURAL; INFILTRATION; INTRACAUDAL; PERINEURAL at 07:25

## 2024-11-29 RX ADMIN — DEXMEDETOMIDINE HCL 8 MCG: 100 INJECTION INTRAVENOUS at 07:45

## 2024-11-29 RX ADMIN — GLYCOPYRROLATE 0.4 MG: 0.2 INJECTION INTRAMUSCULAR; INTRAVENOUS at 07:34

## 2024-11-29 RX ADMIN — MAGNESIUM SULFATE HEPTAHYDRATE 2000 MG: 1 INJECTION, SOLUTION INTRAVENOUS at 07:53

## 2024-11-29 RX ADMIN — DEXMEDETOMIDINE HCL 8 MCG: 100 INJECTION INTRAVENOUS at 08:15

## 2024-11-29 RX ADMIN — KETOROLAC TROMETHAMINE 30 MG: 30 INJECTION, SOLUTION INTRAMUSCULAR at 08:53

## 2024-11-29 RX ADMIN — GENTAMICIN SULFATE 80 MG: 80 INJECTION, SOLUTION INTRAVENOUS at 07:40

## 2024-11-29 RX ADMIN — Medication 100 MCG: at 08:52

## 2024-11-29 RX ADMIN — MIDAZOLAM HYDROCHLORIDE 2 MG: 2 INJECTION, SOLUTION INTRAMUSCULAR; INTRAVENOUS at 07:19

## 2024-11-29 RX ADMIN — FENTANYL CITRATE 100 MCG: 50 INJECTION, SOLUTION INTRAMUSCULAR; INTRAVENOUS at 07:19

## 2024-11-29 RX ADMIN — PHENAZOPYRIDINE HYDROCHLORIDE 100 MG: 100 TABLET ORAL at 06:46

## 2024-11-29 RX ADMIN — LIDOCAINE HYDROCHLORIDE 100 MG: 10 INJECTION, SOLUTION EPIDURAL; INFILTRATION; INTRACAUDAL; PERINEURAL at 07:34

## 2024-11-29 RX ADMIN — SODIUM CHLORIDE, POTASSIUM CHLORIDE, SODIUM LACTATE AND CALCIUM CHLORIDE: 600; 310; 30; 20 INJECTION, SOLUTION INTRAVENOUS at 06:57

## 2024-11-29 RX ADMIN — FAMOTIDINE 20 MG: 10 INJECTION, SOLUTION INTRAVENOUS at 07:13

## 2024-11-29 RX ADMIN — SODIUM CHLORIDE, POTASSIUM CHLORIDE, SODIUM LACTATE AND CALCIUM CHLORIDE: 600; 310; 30; 20 INJECTION, SOLUTION INTRAVENOUS at 08:30

## 2024-11-29 RX ADMIN — FENTANYL CITRATE 50 MCG: 50 INJECTION, SOLUTION INTRAMUSCULAR; INTRAVENOUS at 07:34

## 2024-11-29 RX ADMIN — DEXAMETHASONE SODIUM PHOSPHATE 10 MG: 10 INJECTION INTRAMUSCULAR; INTRAVENOUS at 07:40

## 2024-11-29 RX ADMIN — FENTANYL CITRATE 50 MCG: 50 INJECTION, SOLUTION INTRAMUSCULAR; INTRAVENOUS at 08:15

## 2024-11-29 ASSESSMENT — PAIN SCALES - GENERAL: PAINLEVEL_OUTOF10: 0

## 2024-11-29 ASSESSMENT — PAIN - FUNCTIONAL ASSESSMENT: PAIN_FUNCTIONAL_ASSESSMENT: NONE - DENIES PAIN

## 2024-11-29 NOTE — ANESTHESIA PRE PROCEDURE
Department of Anesthesiology  Preprocedure Note       Name:  Jeana Worthy   Age:  69 y.o.  :  1955                                          MRN:  [id number]         Date:  2024      Surgeon: Surgeon(s):  Shiraz Gray DO    Procedure: Procedure(s):  Robotic Hysterectomy w/ BILATERAL SALPINGO-OOPHORECTOMY VAGINAL ANTERIOR REPAIR AND CYSTOSCOPY    Medications prior to admission:   Prior to Admission medications    Medication Sig Start Date End Date Taking? Authorizing Provider   ibuprofen (ADVIL) 200 MG tablet Take 1 tablet by mouth every 6 hours as needed   Yes Provider, MD Breanna       Current medications:    Current Facility-Administered Medications   Medication Dose Route Frequency Provider Last Rate Last Admin   • fentaNYL (SUBLIMAZE) injection 100 mcg  100 mcg IntraVENous Once PRN Amanda Zelaya MD       • scopolamine (TRANSDERM-SCOP) transdermal patch 1 patch  1 patch TransDERmal Q72H Amanda Zelaya MD   1 patch at 24 0643   • sodium chloride flush 0.9 % injection 5-40 mL  5-40 mL IntraVENous 2 times per day Amanda Zelaya MD       • sodium chloride flush 0.9 % injection 5-40 mL  5-40 mL IntraVENous PRN Amanda Zelaya MD       • 0.9 % sodium chloride infusion   IntraVENous PRN Amanda Zelaya MD       • midazolam PF (VERSED) injection 2 mg  2 mg IntraVENous Once PRN Amanda Zelaya MD       • ceFAZolin 2000 mg in 20 mL SWFI IV Syringe IV syringe            • midazolam (VERSED) 2 MG/2ML injection            • lactated ringers infusion   IntraVENous Continuous Ryan Pacsual  mL/hr at 24 0657 New Bag at 24 0657   • gentamicin (GARAMYCIN) IVPB 80 mg  80 mg IntraVENous Once Lilibeth Zapata DO       • clindamycin (CLEOCIN) 900 mg in dextrose 5 % 50 mL IVPB  900 mg IntraVENous Q8H Lilibeth Zapata DO           Allergies:    Allergies   Allergen Reactions   • Doxycycline Monohydrate Swelling     Tongue swelling   • Penicillins Hives and Nausea And Vomiting   • Valdecoxib Nausea And Vomiting   •

## 2024-11-29 NOTE — ANESTHESIA POSTPROCEDURE EVALUATION
Department of Anesthesiology  Postprocedure Note    Patient: Jeana Worthy  MRN: [id number]  YOB: 1955  Date of evaluation: 11/29/2024    Procedure Summary       Date: 11/29/24 Room / Location: [address]    Anesthesia Start: 0730 Anesthesia Stop: 0938    Procedure: ROBOTIC HYSTERECTOMY WITH BILATERAL SALPINGO-OOPHORECTOMY VAGINAL ANTERIOR REPAIR AND CYSTOSCOPY Diagnosis:       Female cystocele      Uterine prolapse      (Female cystocele [N81.10])      (Uterine prolapse [N81.4])    Surgeons: Shiraz Gray DO Responsible Provider: Ryan Pascual MD    Anesthesia Type: general, regional ASA Status: 2            Anesthesia Type: No value filed.    Paul Phase I: Paul Score: 10    Paul Phase II:      Anesthesia Post Evaluation    Patient location during evaluation: PACU  Patient participation: complete - patient participated  Level of consciousness: awake and alert  Airway patency: patent  Nausea & Vomiting: no nausea and no vomiting  Cardiovascular status: hemodynamically stable  Respiratory status: spontaneous ventilation and nasal cannula  Hydration status: euvolemic  Multimodal analgesia pain management approach  Pain management: adequate    No notable events documented.

## 2024-11-29 NOTE — PROGRESS NOTES
Patient up to bathroom. Patient voided without issue 230mL. Patient PVR 56. Patient back to bed resting comfortably,

## 2024-11-29 NOTE — ANESTHESIA PROCEDURE NOTES
Peripheral Block    Patient location during procedure: pre-op  Reason for block: post-op pain management and at surgeon's request  Start time: 11/29/2024 7:20 AM  End time: 11/29/2024 7:25 AM  Staffing  Performed: anesthesiologist   Anesthesiologist: Ryan Pascual MD  Performed by: Ryan Pascual MD  Authorized by: Ryan Pascual MD    Preanesthetic Checklist  Completed: patient identified, IV checked, site marked, risks and benefits discussed, surgical/procedural consents, equipment checked, pre-op evaluation, timeout performed, anesthesia consent given, oxygen available, monitors applied/VS acknowledged, fire risk safety assessment completed and verbalized and blood product R/B/A discussed and consented  Peripheral Block   Patient position: supine  Prep: ChloraPrep  Provider prep: mask and sterile gloves  Patient monitoring: cardiac monitor, continuous pulse ox, frequent blood pressure checks, IV access, oxygen and responsive to questions  Block type: TAP  Laterality: bilateral  Injection technique: single-shot  Guidance: ultrasound guided    Needle   Needle type: insulated echogenic nerve stimulator needle   Needle gauge: 20 G  Needle localization: anatomical landmarks and ultrasound guidance  Needle length: 4 IN.  Assessment   Injection assessment: negative aspiration for heme, no paresthesia on injection, local visualized surrounding nerve on ultrasound and no intravascular symptoms  Paresthesia pain: none  Slow fractionated injection: yes  Hemodynamics: stable  Outcomes: uncomplicated and patient tolerated procedure well    Additional Notes  10MG DECADRON ADDED TO LOCAL ANESTHETIC SOLUTION  Medications Administered  BUPivacaine (MARCAINE) PF injection 0.25% - Perineural   80 mL - 11/29/2024 7:25:00 AM

## 2024-11-29 NOTE — PROGRESS NOTES
Patient dressed with  assist. Patient to bathroom to void without issues with  pulls up the car. Patient to be discharged with all belongings to awaiting car.

## 2024-12-02 LAB — SURGICAL PATHOLOGY REPORT: NORMAL

## 2024-12-02 NOTE — PROGRESS NOTES
POST OP CALL RECORD      Date:  2024   Time:  9:46 AM   Patient:  Jeana Worthy   :  1955   Procedure: RHBSO, anterior repair  Procedure Date: 24    How are you feeling?  Fine  Are you voiding ok?  Yes  Do you have a catheter?   No  Is the catheter draining?   No  Passing Gas?  Yes  Last BM?  Yesterday  If no BM, what have you done to encourage?  Stool Softeners  Any Nausea/Vomiting/Diarrhea?  No    Diet: regular  Fever:  No  Chest pain: No  Shortness of breath: No  Calf pain: No    Surgical Pain: Yes, 3    What pain medication are you taking? Motrin and Tylenol  Post-Op visit Scheduled for follow up? Friday

## 2024-12-04 NOTE — OP NOTE
These are 3 distinct procedures. The Nuñez's sutures were cinched in successive fashion to elevate the vaginal apex into the sacral hollow, plicate uterosacral ligaments, and obliterate any enterocele sac without obvious stricture of the distal rectum. 1-0 Vicryl was then used to reperitonealize the vaginal cuff. Sponge and needle counts were correct at this time.    A low-pressure test over a 5-minute period confirmed no further bleeding and excellent hemostasis.  The pelvis was irrigated and aspirated with a copious amount of saline.  The robotic instruments were directly visualized and removed through the trocars.  The robot was undocked, and the patient was taken out of steep Trendelenburg and repositioned into supine low lithotomy position while still maintaining cardiopulmonary stability. The RIGHT upper quadrant port was removed, and its incisional fascia was closed with a Radha fascial closure device.  No defects or hernias were noted.  The other smaller ports not requiring fascial closure were removed under direct laparoscopic visualization without herniation or bleeding, and the pneumoperitoneum was released.  All skin incisions were injected with anesthetic and closed with delayed absorbable suture.    The vaginal cuff was then inspected and found to be hemostatic with excellent support.  The anterior colporrhaphy was commenced by making a midline scalpel incision from the apical dependent portion of the cystocele to the proximal bladder neck, as defined by an indwelling Gates catheter.  Saline or anesthetic infiltration was performed as necessary.  Allis clamps were used on the mucosal skin edges for retraction, and the cystocele was dissected away from para-vesical fascia and mucosa. For extreme bladder redundancy, the central bulk of the cystocele was reduced with a midline purse-stringed 2-0 Vicryl suture which lessened risk of ureteral involvement. A two-layer closure of interrupted 2-0

## 2024-12-04 NOTE — PROGRESS NOTES
[facility], [facility] UROGYNECOLOGY AND PELVIC REHABILITATION   [address] [place] [address]  Dept: [phone]   Patient:  Jeana Harvey   :  1955   Visit Date:  2024     CC: 1-2 week postoperative exam.       HPI: Patient is here for first post operative exam. She denies any chest pain or SOB, no LE pain/swelling/tingling, no fever, N/V, no diffuse abdominal pain, pain rating is 1/10 around incision sites, urinating well, bowels moving well - daily; taking a stool softener at night, she denies any vision changes or headaches, no dizziness, no vaginal bleeding.  She denies any UTI symptoms. She is doing well.     Status post: Robotic Hysterectomy With Bilateral Salpingo-oophorectomy Vaginal Anterior Repair And Cystoscopy   Date: 2024     SIGNIFICANT FINDINGS:   Surgical Pathology Report   Date Value Ref Range Status   2024       Path Number: [id number]    -- Diagnosis --  CERVIX, UTERUS, BILATERAL FALLOPIAN TUBES AND OVARIES, HYSTERECTOMY  WITH BILATERAL SALPINGO-OOPHORECTOMY:    CERVIX:  - MILD CHRONIC CERVICITIS.    UTERUS:  - BENIGN ATROPHIC ENDOMETRIUM.  - INTRAMURAL LEIOMYOMA.    BILATERAL FALLOPIAN TUBES:  - BENIGN FALLOPIAN TUBES.    BILATERAL OVARIES:  - BENIGN OVARIES.    VAGINAL MUCOSA, EXCISION:  - BENIGN SQUAMOUS MUCOSA WITH MILD CHRONIC INFLAMMATION.       Trevin Gonzalez M.D.  **Electronically Signed Out**         sls/2024     Clinical Information  Pre-Op Diagnosis:  FEMALE CYSTOCELE; UTERINE PROLAPSE  Operative Findings:  UTERUS, CERVIX, BILATERAL FALLOPIAN TUBES AND  OVARIES, AND VAGINAL MUCOSA  Operation Performed:  ROBOTIC HYSTERECTOMY WITH BILATERAL  SALPINGO-OOPHORECTOMY VAGINAL ANTERIOR REPAIR AND CYSTOSCOPY  se        Source of Specimen  A: UTERUS, CERVIX, BILATERAL FALLOPIAN TUBES AND OVARIES, AND VAGINAL  MUCOSA      Gross Description  \"JEANA HARVEY, UTERUS, CERVIX, BILATERAL

## 2024-12-06 ENCOUNTER — OFFICE VISIT (OUTPATIENT)
Age: 69
End: 2024-12-06

## 2024-12-06 VITALS
HEART RATE: 64 BPM | BODY MASS INDEX: 34.2 KG/M2 | TEMPERATURE: 98.1 F | WEIGHT: 181 LBS | DIASTOLIC BLOOD PRESSURE: 75 MMHG | SYSTOLIC BLOOD PRESSURE: 144 MMHG | OXYGEN SATURATION: 95 %

## 2024-12-06 DIAGNOSIS — R33.9 RETENTION OF URINE: Primary | ICD-10-CM

## 2024-12-06 DIAGNOSIS — Z09 POSTOPERATIVE EXAMINATION: ICD-10-CM

## 2024-12-06 LAB
BILIRUBIN, POC: NORMAL
BLOOD URINE, POC: 250
CLARITY, POC: CLEAR
COLOR, POC: NORMAL
GLUCOSE URINE, POC: NORMAL MG/DL
KETONES, POC: NORMAL MG/DL
LEUKOCYTE EST, POC: 500
NITRITE, POC: NORMAL
PH, POC: 5
PROTEIN, POC: 30 MG/DL
SPECIFIC GRAVITY, POC: 1.02
UROBILINOGEN, POC: NORMAL MG/DL

## 2024-12-06 ASSESSMENT — ENCOUNTER SYMPTOMS: GASTROINTESTINAL NEGATIVE: 1

## 2024-12-06 NOTE — PROGRESS NOTES
One week post-op    Surgery Type:   ROBOTIC HYSTERECTOMY WITH BILATERAL SALPINGO-OOPHORECTOMY VAGINAL ANTERIOR REPAIR AND CYSTOSCOPY     Surgery Date: 11/29/2024    Date urinary cathter was pulled\" na/  Voiding w/o difficulty:yes    Level of pain: 2  Pain medication;   Ibuprofen: 400mg  Tylenol: 500mg  Norco:not taking, date of last dose: na  Percocet: not taking, date of last dose: na    Stool softener: taking  Last bowel movement: 12/06/2024  Bowel movement description: soft and formed    Antibiotics given: no  Antibiotics completed: n/a    Did patient use:   Simethicone yes  Flomax no    Vaginal bleeding: no  Discharge: yes

## 2024-12-13 ENCOUNTER — TELEPHONE (OUTPATIENT)
Age: 69
End: 2024-12-13

## 2024-12-13 NOTE — TELEPHONE ENCOUNTER
Patient feels like she is having an allergic reaction states that she started using Cortizone cream on Sunday when it started. States that this has been working Surgery was 11/29/24. She is unsure what she needs to do she stated that she has some redness on one of the incisions

## 2025-01-10 ENCOUNTER — OFFICE VISIT (OUTPATIENT)
Age: 70
End: 2025-01-10

## 2025-01-10 VITALS
WEIGHT: 183 LBS | OXYGEN SATURATION: 98 % | HEART RATE: 56 BPM | DIASTOLIC BLOOD PRESSURE: 76 MMHG | BODY MASS INDEX: 34.58 KG/M2 | SYSTOLIC BLOOD PRESSURE: 124 MMHG

## 2025-01-10 DIAGNOSIS — N39.3 SUI (STRESS URINARY INCONTINENCE, FEMALE): Primary | ICD-10-CM

## 2025-01-10 NOTE — PROGRESS NOTES
[facility], [facility] UROGYNECOLOGY AND PELVIC REHABILITATION   [address] [place] [address]  Dept: [phone]   Patient:  Jeana Worthy    :  1955   Visit Date:  1/10/2025     VISIT - FINAL OUTPATIENT POST OP EXAM  CC: The patient presents for a final outpatient postoperative exam. had concerns including Post-Op Check (6 week p/o/Patient c/o merary with coughing and blowing her nose ).    Chaperone present for entire visit and pertinent physical exam: None Required    Will send today's note to PCP / referral provider.    HPI:   History of Present Illness  The patient presents for evaluation of stress incontinence.    She is status post reconstructive surgery. Preoperatively, she did not have any evidence to warrant an incontinence sling, but after a cold, she has had some increased stress incontinence. She has no urgency, frequency, UTIs, or blood in the urine. She is satisfied with her prolapse repair. She reports satisfactory bowel movements and expresses a desire to resume lifting activities. She was sexually active prior to the surgery and is currently using 3 to 4 thin pads daily due to incontinence. She also mentions an unpleasant odor associated with her urine. She has been engaging in Pilates reformer exercises and cycling, having undergone bilateral knee replacement surgeries. She recalls a significant bruise post-surgery, which has since resolved.       Status post: Robotic Hysterectomy With Bilateral Salpingo-oophorectomy Vaginal Anterior Repair And Cystoscopy   Date: 2024   Pathology benign. Significant findings: None  Satisfaction: moderately satisfied    Vitals and signs or symptoms of UTI or infection were assessed. The presence of fevers, nausea, vomiting, chest pain, shortness of breath, or calf pain were evaluated.  Postoperative pain was evaluated.  Bowel and bladder habits were reviewed.  Any remaining vaginal

## 2025-01-10 NOTE — PATIENT INSTRUCTIONS
cause difficulties in emptying your bladder.  Although initially you may not be able to hold the contraction for more than a second, regular practice will allow you to increase the length of time you are able to hold your contractions.  Contract your pelvic floor muscles before any event which causes you to leak urine (nose blowing, sneezing, coughing, laughing).  For example, as you feel the urge to cough, tighten your pelvic floor muscles to prevent urine leakage before and during your cough.  After the first few days of exercising your pelvic floor muscles, you may notice some soreness around your pelvic floor muscle area.  If the soreness becomes too uncomfortable, talk to your healthcare provider.  Your healthcare provider may suggest biofeedback, electrical stimulation or vaginal cones.  Biofeedback equipment allows you to identify which muscles to use and to see the strength and duration of your muscle contractions.  Electrical stimulation uses mild electrical impulses to stimulate pelvic floor muscle contractions.  Vaginal cones help to improve muscle strength.  You insert a tampon-shaped cone in your vagina and hold it in place by brunilda your pelvic floor muscles.  As the muscles grow stronger, you increase the weight of the cone.    Seeing and Maintaining Results    After about six to 12 weeks of doing pelvic floor muscle strengthening exercises, you should notice improvement in pelvic floor muscle strength and a decrease in urinary leakage.  Once your pelvic floor muscles have strengthened,  you will not need to do this exercise as rigorously.  However, the benefits of pelvic floor muscle exercises will continue only as long as you do the exercise.  Make pelvic floor muscle exercises a lifelong practice.

## (undated) DEVICE — BLADELESS OBTURATOR: Brand: WECK VISTA

## (undated) DEVICE — DRAINBAG,ANTI-REFLUX TOWER,L/F,2000ML,LL: Brand: MEDLINE

## (undated) DEVICE — SUTURE VICRYL + SZ 0 L27IN ABSRB VLT L26MM UR-6 5/8 CIR VCP603H

## (undated) DEVICE — ARM DRAPE

## (undated) DEVICE — SUTURE PDS II SZ 0 L36IN ABSRB VLT L36MM CT-1 1/2 CIR Z346H

## (undated) DEVICE — UNDERPANTS MAT L XL SEAMLESS CLR CODE WAISTBAND KNIT

## (undated) DEVICE — SUTURE MONOCRYL + SZ 4-0 L27IN ABSRB UD L19MM PS-2 3/8 CIR MCP426H

## (undated) DEVICE — INSUFFLATION NEEDLE TO ESTABLISH PNEUMOPERITONEUM.: Brand: INSUFFLATION NEEDLE

## (undated) DEVICE — PAD PT POS 36 IN SURGYPAD DISP

## (undated) DEVICE — SEAL

## (undated) DEVICE — TROCAR: Brand: KII FIOS FIRST ENTRY

## (undated) DEVICE — TIP COVER ACCESSORY

## (undated) DEVICE — YANKAUER,SMOOTH HANDLE,HIGH CAPACITY: Brand: MEDLINE INDUSTRIES, INC.

## (undated) DEVICE — CATHETER URETH 16FR BLLN 5CC SIL ALLY W/ SIL HYDRGEL 2 W F

## (undated) DEVICE — DEVICE TRCR 12X9X3IN WHT CLSR DISP OMNICLOSE

## (undated) DEVICE — TUBING, SUCTION, 3/16" X 10', STRAIGHT: Brand: MEDLINE

## (undated) DEVICE — SUTURE VICRYL SZ 0 L36IN ABSRB UD L36MM CT-1 1/2 CIR J946H

## (undated) DEVICE — SOLUTION IRRIG 1000ML 0.9% SOD CHL USP POUR PLAS BTL

## (undated) DEVICE — CATHETER URETH 18FR BLLN 30CC 2 W F INF CTRL BARDX

## (undated) DEVICE — GLOVE ORANGE PI 7 1/2   MSG9075

## (undated) DEVICE — VCARE LARGE UTERINE MANIPULATOR: Brand: VCARE LARGE

## (undated) DEVICE — SYRINGE IRRIG 60ML SFT PLIABLE BLB EZ TO GRP 1 HND USE W/

## (undated) DEVICE — MHPB GYN ROBOTIC PACK: Brand: MEDLINE INDUSTRIES, INC.

## (undated) DEVICE — SOLUTION IV 1000ML 0.9% SOD CHL PH 5 INJ USP VIAFLX PLAS

## (undated) DEVICE — SUTURE VICRYL SZ 0 L36IN ABSRB UD CT-1 L36MM 1/2 CIR TAPR PNT VCP946H